# Patient Record
Sex: MALE | Race: WHITE | NOT HISPANIC OR LATINO | URBAN - METROPOLITAN AREA
[De-identification: names, ages, dates, MRNs, and addresses within clinical notes are randomized per-mention and may not be internally consistent; named-entity substitution may affect disease eponyms.]

---

## 2017-11-20 ENCOUNTER — APPOINTMENT (RX ONLY)
Dept: URBAN - METROPOLITAN AREA CLINIC 51 | Facility: CLINIC | Age: 82
Setting detail: DERMATOLOGY
End: 2017-11-20

## 2017-11-20 DIAGNOSIS — Z85.828 PERSONAL HISTORY OF OTHER MALIGNANT NEOPLASM OF SKIN: ICD-10-CM

## 2017-11-20 DIAGNOSIS — L57.0 ACTINIC KERATOSIS: ICD-10-CM

## 2017-11-20 DIAGNOSIS — L82.1 OTHER SEBORRHEIC KERATOSIS: ICD-10-CM

## 2017-11-20 PROBLEM — M12.9 ARTHROPATHY, UNSPECIFIED: Status: ACTIVE | Noted: 2017-11-20

## 2017-11-20 PROBLEM — J30.1 ALLERGIC RHINITIS DUE TO POLLEN: Status: ACTIVE | Noted: 2017-11-20

## 2017-11-20 PROBLEM — I10 ESSENTIAL (PRIMARY) HYPERTENSION: Status: ACTIVE | Noted: 2017-11-20

## 2017-11-20 PROBLEM — E78.5 HYPERLIPIDEMIA, UNSPECIFIED: Status: ACTIVE | Noted: 2017-11-20

## 2017-11-20 PROCEDURE — 99213 OFFICE O/P EST LOW 20 MIN: CPT | Mod: 25

## 2017-11-20 PROCEDURE — ? LIQUID NITROGEN

## 2017-11-20 PROCEDURE — 17003 DESTRUCT PREMALG LES 2-14: CPT

## 2017-11-20 PROCEDURE — 17000 DESTRUCT PREMALG LESION: CPT

## 2017-11-20 ASSESSMENT — LOCATION DETAILED DESCRIPTION DERM
LOCATION DETAILED: LEFT MID-UPPER BACK
LOCATION DETAILED: LEFT LATERAL FOREHEAD
LOCATION DETAILED: LEFT CLAVICULAR NECK
LOCATION DETAILED: RIGHT SUPERIOR LATERAL FOREHEAD
LOCATION DETAILED: LEFT INFERIOR CENTRAL MALAR CHEEK
LOCATION DETAILED: RIGHT POSTERIOR ANKLE
LOCATION DETAILED: RIGHT LATERAL MALAR CHEEK
LOCATION DETAILED: LEFT SCAPHA
LOCATION DETAILED: RIGHT CENTRAL EYEBROW
LOCATION DETAILED: RIGHT CLAVICULAR NECK
LOCATION DETAILED: RIGHT SUPERIOR MEDIAL MIDBACK
LOCATION DETAILED: LEFT INFERIOR HELIX

## 2017-11-20 ASSESSMENT — LOCATION SIMPLE DESCRIPTION DERM
LOCATION SIMPLE: LEFT UPPER BACK
LOCATION SIMPLE: RIGHT ANTERIOR NECK
LOCATION SIMPLE: LEFT FOREHEAD
LOCATION SIMPLE: RIGHT CHEEK
LOCATION SIMPLE: LEFT EAR
LOCATION SIMPLE: LEFT CHEEK
LOCATION SIMPLE: RIGHT ANKLE
LOCATION SIMPLE: RIGHT LOWER BACK
LOCATION SIMPLE: RIGHT EYEBROW
LOCATION SIMPLE: RIGHT FOREHEAD
LOCATION SIMPLE: LEFT ANTERIOR NECK

## 2017-11-20 ASSESSMENT — LOCATION ZONE DERM
LOCATION ZONE: NECK
LOCATION ZONE: FACE
LOCATION ZONE: LEG
LOCATION ZONE: TRUNK
LOCATION ZONE: EAR

## 2017-11-20 ASSESSMENT — PAIN INTENSITY VAS: HOW INTENSE IS YOUR PAIN 0 BEING NO PAIN, 10 BEING THE MOST SEVERE PAIN POSSIBLE?: NO PAIN

## 2017-11-20 NOTE — HPI: NON-MELANOMA SKIN CANCER F/U (HISTORY OF NMSC)
What Is The Reason For Today's Visit?: Surveillance against skin cancer recurrences
How Many Skin Cancers Have You Had?: more than one
Additional History: yearly skin check
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

## 2017-11-20 NOTE — PROCEDURE: LIQUID NITROGEN
Consent: The patient's verbal consent was obtained including but not limited to risks of crusting, scabbing, blistering, scarring, darker or lighter pigmentary change, recurrence, incomplete removal and infection.
Render Post-Care Instructions In Note?: yes
Number Of Freeze-Thaw Cycles: 2 freeze-thaw cycles
Duration Of Freeze Thaw-Cycle (Seconds): 5
Post-Care Instructions: I reviewed with the patient in detail post-care instructions. Patient is to wear sunprotection, and avoid picking at any of the treated lesions. Pt may apply Vaseline to crusted or scabbing areas.
Detail Level: Zone

## 2018-11-19 ENCOUNTER — APPOINTMENT (RX ONLY)
Dept: URBAN - METROPOLITAN AREA CLINIC 51 | Facility: CLINIC | Age: 83
Setting detail: DERMATOLOGY
End: 2018-11-19

## 2018-11-19 DIAGNOSIS — B07.8 OTHER VIRAL WARTS: ICD-10-CM

## 2018-11-19 DIAGNOSIS — L82.1 OTHER SEBORRHEIC KERATOSIS: ICD-10-CM

## 2018-11-19 DIAGNOSIS — Z85.828 PERSONAL HISTORY OF OTHER MALIGNANT NEOPLASM OF SKIN: ICD-10-CM

## 2018-11-19 DIAGNOSIS — L57.0 ACTINIC KERATOSIS: ICD-10-CM

## 2018-11-19 PROCEDURE — 99213 OFFICE O/P EST LOW 20 MIN: CPT | Mod: 25

## 2018-11-19 PROCEDURE — ? PRESCRIPTION

## 2018-11-19 PROCEDURE — 17000 DESTRUCT PREMALG LESION: CPT

## 2018-11-19 PROCEDURE — ? COUNSELING

## 2018-11-19 PROCEDURE — ? LIQUID NITROGEN

## 2018-11-19 PROCEDURE — ? RECOMMENDATIONS

## 2018-11-19 RX ORDER — FLUOROURACIL 2 G/40G
CREAM TOPICAL QHS
Qty: 1 | Refills: 1 | Status: ERX | COMMUNITY
Start: 2018-11-19

## 2018-11-19 RX ADMIN — FLUOROURACIL: 2 CREAM TOPICAL at 00:00

## 2018-11-19 ASSESSMENT — LOCATION SIMPLE DESCRIPTION DERM
LOCATION SIMPLE: RIGHT CHEEK
LOCATION SIMPLE: LEFT UPPER ARM
LOCATION SIMPLE: LEFT FOREHEAD
LOCATION SIMPLE: LEFT CHEEK
LOCATION SIMPLE: LEFT MIDDLE FINGER

## 2018-11-19 ASSESSMENT — LOCATION DETAILED DESCRIPTION DERM
LOCATION DETAILED: RIGHT SUPERIOR CENTRAL MALAR CHEEK
LOCATION DETAILED: LEFT ANTERIOR DISTAL UPPER ARM
LOCATION DETAILED: LEFT LATERAL FOREHEAD
LOCATION DETAILED: LEFT MIDDLE FINGERTIP
LOCATION DETAILED: LEFT INFERIOR CENTRAL MALAR CHEEK

## 2018-11-19 ASSESSMENT — LOCATION ZONE DERM
LOCATION ZONE: FACE
LOCATION ZONE: FINGER
LOCATION ZONE: ARM

## 2018-11-19 ASSESSMENT — PAIN INTENSITY VAS: HOW INTENSE IS YOUR PAIN 0 BEING NO PAIN, 10 BEING THE MOST SEVERE PAIN POSSIBLE?: NO PAIN

## 2018-11-19 ASSESSMENT — TOTAL NUMBER OF VERRUCA VULGARIS: # OF LESIONS?: 1

## 2018-11-19 NOTE — PROCEDURE: LIQUID NITROGEN
Consent: The patient's verbal consent was obtained including but not limited to risks of crusting, scabbing, blistering, scarring, darker or lighter pigmentary change, recurrence, incomplete removal and infection.
Render Post-Care Instructions In Note?: yes
Detail Level: Simple
Number Of Freeze-Thaw Cycles: 2 freeze-thaw cycles
Duration Of Freeze Thaw-Cycle (Seconds): 5
Post-Care Instructions: I reviewed with the patient in detail post-care instructions. Patient is to wear sunprotection, and avoid picking at any of the treated lesions. Pt may apply Vaseline to crusted or scabbing areas.

## 2018-11-19 NOTE — PROCEDURE: RECOMMENDATIONS
Recommendation Preamble: Getting 17% Salicyclic acid (compound W) and apply to wart after applying Efudex cream to finger qhs x 4 weeks or until wart is gone.
Detail Level: Zone

## 2019-02-05 ENCOUNTER — APPOINTMENT (RX ONLY)
Dept: URBAN - METROPOLITAN AREA CLINIC 51 | Facility: CLINIC | Age: 84
Setting detail: DERMATOLOGY
End: 2019-02-05

## 2019-02-05 DIAGNOSIS — L82.1 OTHER SEBORRHEIC KERATOSIS: ICD-10-CM

## 2019-02-05 DIAGNOSIS — B07.8 OTHER VIRAL WARTS: ICD-10-CM

## 2019-02-05 PROCEDURE — ? LIQUID NITROGEN

## 2019-02-05 PROCEDURE — ? COUNSELING

## 2019-02-05 PROCEDURE — 99213 OFFICE O/P EST LOW 20 MIN: CPT | Mod: 25

## 2019-02-05 PROCEDURE — 17110 DESTRUCTION B9 LES UP TO 14: CPT

## 2019-02-05 ASSESSMENT — LOCATION ZONE DERM
LOCATION ZONE: TRUNK
LOCATION ZONE: FINGER
LOCATION ZONE: EAR

## 2019-02-05 ASSESSMENT — LOCATION SIMPLE DESCRIPTION DERM
LOCATION SIMPLE: LEFT MIDDLE FINGER
LOCATION SIMPLE: CHEST
LOCATION SIMPLE: LEFT EAR

## 2019-02-05 ASSESSMENT — LOCATION DETAILED DESCRIPTION DERM
LOCATION DETAILED: LEFT MIDDLE FINGERTIP
LOCATION DETAILED: LEFT MEDIAL INFERIOR CHEST
LOCATION DETAILED: LEFT SUPERIOR POSTERIOR HELIX

## 2019-02-05 NOTE — PROCEDURE: LIQUID NITROGEN
Add 52 Modifier (Optional): no
Medical Necessity Clause: This procedure was medically necessary because the lesions that were treated were: irritated by shaving
Medical Necessity Information: It is in your best interest to select a reason for this procedure from the list below. All of these items fulfill various CMS LCD requirements except the new and changing color options.
Duration Of Freeze Thaw-Cycle (Seconds): 5
Detail Level: Detailed
Consent: The patient's verbal consent was obtained including but not limited to risks of crusting, scabbing, blistering, scarring, darker or lighter pigmentary change, recurrence, incomplete removal and infection.
Render Post-Care Instructions In Note?: yes
Number Of Freeze-Thaw Cycles: 2 freeze-thaw cycles
Post-Care Instructions: I reviewed with the patient in detail post-care instructions. Patient is to wear sunprotection, and avoid picking at any of the treated lesions. Pt may apply Vaseline to crusted or scabbing areas.

## 2019-03-05 ENCOUNTER — HOSPITAL ENCOUNTER (OUTPATIENT)
Dept: HOSPITAL 75 - RAD FS | Age: 84
End: 2019-03-05
Attending: FAMILY MEDICINE
Payer: MEDICARE

## 2019-03-05 DIAGNOSIS — G93.89: Primary | ICD-10-CM

## 2019-03-05 DIAGNOSIS — R41.0: ICD-10-CM

## 2019-03-05 DIAGNOSIS — G44.89: ICD-10-CM

## 2019-03-05 PROCEDURE — 70450 CT HEAD/BRAIN W/O DYE: CPT

## 2019-03-05 NOTE — DIAGNOSTIC IMAGING REPORT
INDICATION: Headache, dizziness, and disorientation.



TECHNIQUE: Noncontrast brain CT is performed.



COMPARISON: There is no prior study for comparison.



FINDINGS: There are mild atrophic changes. There are no

extra-axial fluid collections. No intracranial hemorrhage. No

intracranial mass or mass effect. No midline shift. The

ventricles are normal in size and position. There are no acute

parenchymal abnormalities in the brain. Calvarial windows appear

unremarkable. The mastoid air cells are well aerated.



IMPRESSION:



Mild atrophic changes with no acute intracranial process.



Dictated by: 



  Dictated on workstation # [id number]

## 2019-11-19 ENCOUNTER — APPOINTMENT (RX ONLY)
Dept: URBAN - METROPOLITAN AREA CLINIC 51 | Facility: CLINIC | Age: 84
Setting detail: DERMATOLOGY
End: 2019-11-19

## 2019-11-19 DIAGNOSIS — Z85.828 PERSONAL HISTORY OF OTHER MALIGNANT NEOPLASM OF SKIN: ICD-10-CM

## 2019-11-19 DIAGNOSIS — L82.1 OTHER SEBORRHEIC KERATOSIS: ICD-10-CM

## 2019-11-19 DIAGNOSIS — B07.8 OTHER VIRAL WARTS: ICD-10-CM

## 2019-11-19 DIAGNOSIS — L50.8 OTHER URTICARIA: ICD-10-CM

## 2019-11-19 PROCEDURE — ? COUNSELING

## 2019-11-19 PROCEDURE — ? LIQUID NITROGEN

## 2019-11-19 PROCEDURE — 17110 DESTRUCTION B9 LES UP TO 14: CPT

## 2019-11-19 PROCEDURE — ? RECOMMENDATIONS

## 2019-11-19 PROCEDURE — 99213 OFFICE O/P EST LOW 20 MIN: CPT | Mod: 25

## 2019-11-19 PROCEDURE — ? ADDITIONAL NOTES

## 2019-11-19 ASSESSMENT — LOCATION SIMPLE DESCRIPTION DERM
LOCATION SIMPLE: LEFT CHEEK
LOCATION SIMPLE: LEFT UPPER BACK
LOCATION SIMPLE: ABDOMEN
LOCATION SIMPLE: RIGHT THUMB
LOCATION SIMPLE: LEFT ANTERIOR NECK

## 2019-11-19 ASSESSMENT — LOCATION DETAILED DESCRIPTION DERM
LOCATION DETAILED: RIGHT LATERAL ABDOMEN
LOCATION DETAILED: RIGHT DISTAL RADIAL THUMB
LOCATION DETAILED: LEFT SUPERIOR LATERAL BUCCAL CHEEK
LOCATION DETAILED: LEFT CLAVICULAR NECK
LOCATION DETAILED: LEFT SUPERIOR LATERAL UPPER BACK
LOCATION DETAILED: LEFT LATERAL ABDOMEN
LOCATION DETAILED: LEFT INFERIOR CENTRAL MALAR CHEEK

## 2019-11-19 ASSESSMENT — LOCATION ZONE DERM
LOCATION ZONE: FACE
LOCATION ZONE: FINGER
LOCATION ZONE: NECK
LOCATION ZONE: TRUNK

## 2019-11-19 ASSESSMENT — PAIN INTENSITY VAS: HOW INTENSE IS YOUR PAIN 0 BEING NO PAIN, 10 BEING THE MOST SEVERE PAIN POSSIBLE?: NO PAIN

## 2019-11-19 NOTE — PROCEDURE: LIQUID NITROGEN
Include Z78.9 (Other Specified Conditions Influencing Health Status) As An Associated Diagnosis?: No
Detail Level: Detailed
Duration Of Freeze Thaw-Cycle (Seconds): 5
Medical Necessity Information: It is in your best interest to select a reason for this procedure from the list below. All of these items fulfill various CMS LCD requirements except the new and changing color options.
Consent: The patient's verbal consent was obtained including but not limited to risks of crusting, scabbing, blistering, scarring, darker or lighter pigmentary change, recurrence, incomplete removal and infection.
Medical Necessity Clause: This procedure was medically necessary because the lesions that were treated were: irritated by shaving
Render Post-Care Instructions In Note?: yes
Number Of Freeze-Thaw Cycles: 2 freeze-thaw cycles
Post-Care Instructions: I reviewed with the patient in detail post-care instructions. Patient is to wear sunprotection, and avoid picking at any of the treated lesions. Pt may apply Vaseline to crusted or scabbing areas.

## 2019-11-19 NOTE — PROCEDURE: ADDITIONAL NOTES
Detail Level: Simple
Additional Notes: pt notes wt gain resulting in tighter pants.  The itch is only along his waist band.  There is no rash or erythema present today.  I recommend he try lose fitting sweat pants for a couple of days and see if that relieves the symptoms.  He gets drowsy with almost all anti histamines so I am hesitant to recommend those.

## 2023-02-10 NOTE — HPI: NON-MELANOMA SKIN CANCER F/U (HISTORY OF NMSC)
What Is The Reason For Today's Visit?: Surveillance against skin cancer recurrences
How Many Skin Cancers Have You Had?: more than one
Additional History: skin check
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
show